# Patient Record
Sex: MALE | Race: WHITE | NOT HISPANIC OR LATINO | URBAN - METROPOLITAN AREA
[De-identification: names, ages, dates, MRNs, and addresses within clinical notes are randomized per-mention and may not be internally consistent; named-entity substitution may affect disease eponyms.]

---

## 2018-09-13 ASSESSMENT — MIFFLIN-ST. JEOR: SCORE: 1898.59

## 2018-09-17 ENCOUNTER — ANESTHESIA - HEALTHEAST (OUTPATIENT)
Dept: SURGERY | Facility: CLINIC | Age: 53
End: 2018-09-17

## 2018-09-17 ENCOUNTER — SURGERY - HEALTHEAST (OUTPATIENT)
Dept: SURGERY | Facility: CLINIC | Age: 53
End: 2018-09-17

## 2018-09-17 ASSESSMENT — MIFFLIN-ST. JEOR
SCORE: 1851.25
SCORE: 1851.25

## 2021-06-01 VITALS — BODY MASS INDEX: 29.06 KG/M2 | HEIGHT: 72 IN | WEIGHT: 214.56 LBS

## 2021-06-16 PROBLEM — M16.9 DEGENERATIVE JOINT DISEASE (DJD) OF HIP: Status: ACTIVE | Noted: 2018-09-17

## 2021-06-20 NOTE — ANESTHESIA PREPROCEDURE EVALUATION
Anesthesia Evaluation      Patient summary reviewed   No history of anesthetic complications     Airway   Mallampati: II  Neck ROM: full   Pulmonary     breath sounds clear to auscultation  (-) COPD, asthma, rhonchi, wheezes, rales, stridor, not a smoker                         Cardiovascular   Exercise tolerance: > or = 4 METS  (+) hypertension, dysrhythmias (paroxysmal SVT), ,     (-) murmur  ECG reviewed (9/13/18: sinus joceline, 55 bpm)  Rhythm: regular  Rate: normal,    no murmur   ROS comment: Hx of nonsustained Vtach, SVT w/ multiple ablation procedures  EF 44% on cardiac MRI  Coronary Cath Feb 2018 - mild CAD     Neuro/Psych    (-) no seizures, no CVA    Endo/Other    (+) obesity (BMI 30.51),   (-) no diabetes, hypothyroidism     GI/Hepatic/Renal    (-) impaired hepatic function, renal disease     Other findings: Labs 9/12/18:  Hgb 15.4  Na 140, K 4.6, Cr 1.06      Dental - normal exam     Comment: No loose, chipped, partial, removable or dentures.                         Anesthesia Plan  Planned anesthetic: spinal  Plan for spinal with propofol gtt.  ASA 2     Anesthetic plan and risks discussed with: patient    Post-op plan: routine recovery

## 2021-06-20 NOTE — ANESTHESIA CARE TRANSFER NOTE
Last vitals:   Vitals:    09/17/18 1243   BP: 101/59   Pulse: 74   Resp: 14   Temp: 36.4  C (97.5  F)   SpO2: 99%     Patient's level of consciousness is awake  Spontaneous respirations: yes  Maintains airway independently: yes  Dentition unchanged: yes  Oropharynx: oropharynx clear of all foreign objects    QCDR Measures:  ASA# 20 - Surgical Safety Checklist: WHO surgical safety checklist completed prior to induction  PQRS# 430 - Adult PONV Prevention: 4558F - Pt received => 2 anti-emetic agents (different classes) preop & intraop  ASA# 8 - Peds PONV Prevention: NA - Not pediatric patient, not GA or 2 or more risk factors NOT present  PQRS# 424 - Sheri-op Temp Management: 4559F - At least one body temp DOCUMENTED => 35.5C or 95.9F within required timeframe  PQRS# 426 - PACU Transfer Protocol: - Transfer of care checklist used  ASA# 14 - Acute Post-op Pain:

## 2021-06-20 NOTE — ANESTHESIA POSTPROCEDURE EVALUATION
Patient: Garrett Umaña  RIGHT TOTAL HIP ARTHROPLASTY  Anesthesia type: spinal    Patient location: PACU  Last vitals:   Vitals:    09/17/18 1310   BP: 92/58   Pulse: (!) 54   Resp: 14   Temp:    SpO2: 100%     Post vital signs: stable  Level of consciousness: awake and responds to simple questions  Post-anesthesia pain: pain controlled  Post-anesthesia nausea and vomiting: no  Pulmonary: unassisted, return to baseline  Cardiovascular: stable and blood pressure at baseline  Hydration: adequate  Anesthetic events: no    QCDR Measures:  ASA# 11 - Sheri-op Cardiac Arrest: ASA11B - Patient did NOT experience unanticipated cardiac arrest  ASA# 12 - Sheri-op Mortality Rate: ASA12B - Patient did NOT die  ASA# 13 - PACU Re-Intubation Rate: ASA13B - Patient did NOT require a new airway mgmt  ASA# 10 - Composite Anes Safety: ASA10A - No serious adverse event    Additional Notes:

## 2021-06-20 NOTE — ANESTHESIA PROCEDURE NOTES
Spinal Block    Patient location during procedure: OR  Start time: 9/17/2018 10:27 AM  End time: 9/17/2018 10:29 AM  Reason for block: primary anesthetic    Staffing:  Performing  Anesthesiologist: KILEY SAMAYOA    Preanesthetic Checklist  Completed: patient identified, risks, benefits, and alternatives discussed, timeout performed, consent obtained, airway assessed, oxygen available, suction available, emergency drugs available and hand hygiene performed  Spinal Block  Patient position: sitting  Prep: ChloraPrep  Patient monitoring: heart rate, cardiac monitor, continuous pulse ox and blood pressure  Approach: midline  Location: L3-4  Injection technique: single-shot  Needle type: pencil-tip   Needle gauge: 24 G      Additional Notes:  No complications